# Patient Record
Sex: MALE | Race: WHITE | NOT HISPANIC OR LATINO | Employment: OTHER | URBAN - METROPOLITAN AREA
[De-identification: names, ages, dates, MRNs, and addresses within clinical notes are randomized per-mention and may not be internally consistent; named-entity substitution may affect disease eponyms.]

---

## 2017-04-19 ENCOUNTER — OFFICE VISIT (OUTPATIENT)
Dept: OPHTHALMOLOGY | Facility: CLINIC | Age: 77
End: 2017-04-19
Payer: MEDICARE

## 2017-04-19 DIAGNOSIS — H00.13 CHALAZION, RIGHT: Primary | ICD-10-CM

## 2017-04-19 PROCEDURE — 99999 PR PBB SHADOW E&M-EST. PATIENT-LVL II: CPT | Mod: PBBFAC,,, | Performed by: OPHTHALMOLOGY

## 2017-04-19 PROCEDURE — 92012 INTRM OPH EXAM EST PATIENT: CPT | Mod: S$GLB,,, | Performed by: OPHTHALMOLOGY

## 2017-04-19 RX ORDER — SIMVASTATIN 40 MG/1
40 TABLET, FILM COATED ORAL NIGHTLY
COMMUNITY

## 2017-04-19 RX ORDER — ASPIRIN 81 MG/1
81 TABLET ORAL DAILY
COMMUNITY

## 2017-04-19 RX ORDER — NEOMYCIN SULFATE, POLYMYXIN B SULFATE, AND DEXAMETHASONE 3.5; 10000; 1 MG/G; [USP'U]/G; MG/G
OINTMENT OPHTHALMIC 4 TIMES DAILY
Qty: 1 TUBE | Refills: 1 | Status: SHIPPED | OUTPATIENT
Start: 2017-04-19 | End: 2017-04-29

## 2017-04-19 RX ORDER — IRBESARTAN 75 MG/1
75 TABLET ORAL NIGHTLY
COMMUNITY

## 2017-04-19 RX ORDER — PRASUGREL HYDROCHLORIDE 10 MG/1
10 TABLET, COATED ORAL DAILY
COMMUNITY
End: 2019-03-11

## 2017-04-19 NOTE — PROGRESS NOTES
HPI     Patient c/o redness with  soreness od for one week and yesterday the bump   appeared on the RLL.        LAST SAW [facility] 04/15/15  YOU DID PCIOL OU X YEARS AGO(PATIENT HAS MONOVISION  IMPLANTS)  OD FOR D  OS FOR N       Last edited by MARILU Tolentino on 4/19/2017  4:07 PM.         Assessment /Plan     For exam results, see Encounter Report.      ICD-10-CM ICD-9-CM    1. Nayana, right H00.13 373.2 Will begin Maxitrol LENY QID OD for 10 days then stop, also recommend WC at least twice a day        Return to clinic PRN

## 2017-04-26 ENCOUNTER — TELEPHONE (OUTPATIENT)
Dept: OPHTHALMOLOGY | Facility: CLINIC | Age: 77
End: 2017-04-26

## 2017-04-26 NOTE — TELEPHONE ENCOUNTER
----- Message from Alba Sanches sent at 4/26/2017 11:27 AM CDT -----  Contact: pt   States he is calling rg having some questions about his medicine that he was given on his last visit and can be reached at [phone]//thanks/law    Wants to know if he can use a salve also

## 2019-02-08 ENCOUNTER — OFFICE VISIT (OUTPATIENT)
Dept: OPHTHALMOLOGY | Facility: CLINIC | Age: 79
End: 2019-02-08
Payer: MEDICARE

## 2019-02-08 DIAGNOSIS — H04.123 DRY EYE SYNDROME OF BOTH EYES: ICD-10-CM

## 2019-02-08 DIAGNOSIS — Z96.1 PSEUDOPHAKIA OF BOTH EYES: Primary | ICD-10-CM

## 2019-02-08 DIAGNOSIS — H26.491 PCO (POSTERIOR CAPSULE OPACIFICATION), RIGHT: ICD-10-CM

## 2019-02-08 PROCEDURE — 99999 PR PBB SHADOW E&M-EST. PATIENT-LVL I: ICD-10-PCS | Mod: PBBFAC,,, | Performed by: OPHTHALMOLOGY

## 2019-02-08 PROCEDURE — 99999 PR PBB SHADOW E&M-EST. PATIENT-LVL I: CPT | Mod: PBBFAC,,, | Performed by: OPHTHALMOLOGY

## 2019-02-08 PROCEDURE — 92014 PR EYE EXAM, EST PATIENT,COMPREHESV: ICD-10-PCS | Mod: S$GLB,,, | Performed by: OPHTHALMOLOGY

## 2019-02-08 PROCEDURE — 92014 COMPRE OPH EXAM EST PT 1/>: CPT | Mod: S$GLB,,, | Performed by: OPHTHALMOLOGY

## 2019-02-08 NOTE — PROGRESS NOTES
HPI     The patient states he is here having a yearly checkup. The patient states   his eyes have been pretty dry and he has started taking theratears.    LAST SAW [facility] 4/19/17    1. PCIOL OU X YEARS AGO( PATIENT HAS MONOVISION IMPLANTS)  OD FOR D  OS FOR N  2. HX Chalazion OD    Thera Tears     Last edited by Ema Lozano on 2/8/2019  1:29 PM. (History)            Assessment /Plan     For exam results, see Encounter Report.      ICD-10-CM ICD-9-CM    1. Pseudophakia of both eyes Z96.1 V43.1 Stable    2. 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 Yag laser treatment as needed if visual loss occurs.            3. Dry eye syndrome of both eyes H04.123 375.15 Well. Use thera tears fish oil and systane.        Return to clinic 1 year or PRN

## 2019-03-11 ENCOUNTER — OFFICE VISIT (OUTPATIENT)
Dept: OPHTHALMOLOGY | Facility: CLINIC | Age: 79
End: 2019-03-11
Payer: MEDICARE

## 2019-03-11 DIAGNOSIS — H43.811 POSTERIOR VITREOUS DEGENERATION, RIGHT: Primary | ICD-10-CM

## 2019-03-11 DIAGNOSIS — H43.11 VITREOUS HEMORRHAGE OF RIGHT EYE: ICD-10-CM

## 2019-03-11 PROCEDURE — 92014 PR EYE EXAM, EST PATIENT,COMPREHESV: ICD-10-PCS | Mod: S$GLB,,, | Performed by: OPHTHALMOLOGY

## 2019-03-11 PROCEDURE — 99999 PR PBB SHADOW E&M-EST. PATIENT-LVL II: ICD-10-PCS | Mod: PBBFAC,,, | Performed by: OPHTHALMOLOGY

## 2019-03-11 PROCEDURE — 99999 PR PBB SHADOW E&M-EST. PATIENT-LVL II: CPT | Mod: PBBFAC,,, | Performed by: OPHTHALMOLOGY

## 2019-03-11 PROCEDURE — 92014 COMPRE OPH EXAM EST PT 1/>: CPT | Mod: S$GLB,,, | Performed by: OPHTHALMOLOGY

## 2019-03-11 NOTE — PATIENT INSTRUCTIONS
Office Number [phone] or [phone]  For after hours call [phone]   In emergency only after hours call or text Dr. Clinton cell phone [phone]  For an appointment change or non-emergent issues, please call the office number during office hours.    Keep in mind, Dr. De Leon clinic hours are usually 7:00 - 3:30.  We are not here until 5:00 on most days.

## 2019-03-19 ENCOUNTER — OFFICE VISIT (OUTPATIENT)
Dept: OPHTHALMOLOGY | Facility: CLINIC | Age: 79
End: 2019-03-19
Payer: MEDICARE

## 2019-03-19 DIAGNOSIS — H43.811 POSTERIOR VITREOUS DEGENERATION, RIGHT: ICD-10-CM

## 2019-03-19 DIAGNOSIS — H43.11 VITREOUS HEMORRHAGE OF RIGHT EYE: Primary | ICD-10-CM

## 2019-03-19 PROCEDURE — 92012 INTRM OPH EXAM EST PATIENT: CPT | Mod: S$GLB,,, | Performed by: OPHTHALMOLOGY

## 2019-03-19 PROCEDURE — 99999 PR PBB SHADOW E&M-EST. PATIENT-LVL II: CPT | Mod: PBBFAC,,, | Performed by: OPHTHALMOLOGY

## 2019-03-19 PROCEDURE — 92012 PR EYE EXAM, EST PATIENT,INTERMED: ICD-10-PCS | Mod: S$GLB,,, | Performed by: OPHTHALMOLOGY

## 2019-03-19 PROCEDURE — 99999 PR PBB SHADOW E&M-EST. PATIENT-LVL II: ICD-10-PCS | Mod: PBBFAC,,, | Performed by: OPHTHALMOLOGY

## 2019-03-19 NOTE — PROGRESS NOTES
===============================  03/19/2019   Georgi Salguero,   78 y.o. male   Last visit [facility]: :3/11/2019   Last visit [facility] dept[facility] 3/11/2019  [facility]:  Uncorrected distance visual acuity was 20/30 -1 in the right eye and J1 in the left eye.  Tonometry     Tonometry (Applanation, 1:10 PM)       Right Left    Pressure 14 14               Not recorded         Not recorded        Chief Complaint   Patient presents with    Vitreous Heme w/Smith Ring     1 week recheck PVD/V-heme OD. pt states no changes since his visit last week. no ocular pain or irritation. vision is the same.         HPI     Vitreous Heme w/Smith Ring      Additional comments: 1 week recheck PVD/V-heme OD. pt states no changes   since his visit last week. no ocular pain or irritation. vision is the   same.               Comments     LAST SAW [facility] 4/19/17    1. PCIOL OU X YEARS AGO( PATIENT HAS MONOVISION IMPLANTS)  OD FOR D  OS FOR N  2. HX Chalazion OD  3. Vitreous Heme/PVD w/ Smith ring OD 3/11/19    Thera Tears          Last edited by Elder Byrne on 3/19/2019  1:13 PM. (History)          ________________  3/19/2019  Problem List Items Addressed This Visit        Eye/Vision problems    Vitreous hemorrhage of right eye - Primary    Posterior vitreous degeneration, right          .resolved v heme  Reviewed s/s RT, RD  Instructed to call with any worsening  Otherwise rtc with Dr. Walters as scheduled       ============================

## 2019-07-29 ENCOUNTER — TELEPHONE (OUTPATIENT)
Dept: OPHTHALMOLOGY | Facility: CLINIC | Age: 79
End: 2019-07-29

## 2019-07-29 NOTE — TELEPHONE ENCOUNTER
----- Message from Avis Sanders sent at 7/29/2019 11:03 AM CDT -----  Contact: Patient  Patient states he has a bump on lower part of eye and needs a refill on the suave that was prescribed before for him, he didn't know the name- [facility]. Please call patient back if needed at [phone]. Thank you

## 2019-07-29 NOTE — TELEPHONE ENCOUNTER
Spoke with pt.     Pt states bump is very small.   Advised to try warm compresses.  If doesn't resolve after 2 weeks or so we can arrange appt for removal.

## 2019-08-01 ENCOUNTER — OFFICE VISIT (OUTPATIENT)
Dept: OPHTHALMOLOGY | Facility: CLINIC | Age: 79
End: 2019-08-01
Payer: COMMERCIAL

## 2019-08-01 DIAGNOSIS — H02.9 EYELID LESION: Primary | ICD-10-CM

## 2019-08-01 PROCEDURE — 99999 PR PBB SHADOW E&M-EST. PATIENT-LVL II: CPT | Mod: PBBFAC,,, | Performed by: OPTOMETRIST

## 2019-08-01 PROCEDURE — 99999 PR PBB SHADOW E&M-EST. PATIENT-LVL II: ICD-10-PCS | Mod: PBBFAC,,, | Performed by: OPTOMETRIST

## 2019-08-01 PROCEDURE — 92012 PR EYE EXAM, EST PATIENT,INTERMED: ICD-10-PCS | Mod: S$GLB,,, | Performed by: OPTOMETRIST

## 2019-08-01 PROCEDURE — 92012 INTRM OPH EXAM EST PATIENT: CPT | Mod: S$GLB,,, | Performed by: OPTOMETRIST

## 2019-08-01 NOTE — PROGRESS NOTES
HPI     Eye Problem      Additional comments: OD irritated OD              Comments     Last seen by [facility] on 3/19/19 for Vitreous Hemorrhage OD  Patient states he has noticed a small bump in the right eye  Pain Scale:  3  Onset:   4-6 months  OD, OS, OU:   OD  Discharge:   Yes  A.M. Matting:  Yes  Itch:   No  Redness:   Yes  Photophobia:   No  Foreign body sensation:   No  Deep pain:   Yes  Previous occurrence:   Yes  Drops:   No drops warm compress          Last edited by Arpita Carr, PCT on 8/1/2019  1:11 PM. (History)            Assessment /Plan     For exam results, see Encounter Report.    Eyelid lesion    umbilicated lesion along RL lid margin  Refer to Dr Walters for consult

## 2019-08-09 ENCOUNTER — TELEPHONE (OUTPATIENT)
Dept: OPHTHALMOLOGY | Facility: CLINIC | Age: 79
End: 2019-08-09

## 2019-08-09 ENCOUNTER — OFFICE VISIT (OUTPATIENT)
Dept: OPHTHALMOLOGY | Facility: CLINIC | Age: 79
End: 2019-08-09
Payer: MEDICARE

## 2019-08-09 DIAGNOSIS — H02.9 EYELID LESION: Primary | ICD-10-CM

## 2019-08-09 PROCEDURE — 92012 PR EYE EXAM, EST PATIENT,INTERMED: ICD-10-PCS | Mod: S$GLB,,, | Performed by: OPHTHALMOLOGY

## 2019-08-09 PROCEDURE — 99999 PR PBB SHADOW E&M-EST. PATIENT-LVL II: ICD-10-PCS | Mod: PBBFAC,,, | Performed by: OPHTHALMOLOGY

## 2019-08-09 PROCEDURE — 99999 PR PBB SHADOW E&M-EST. PATIENT-LVL II: CPT | Mod: PBBFAC,,, | Performed by: OPHTHALMOLOGY

## 2019-08-09 PROCEDURE — 92012 INTRM OPH EXAM EST PATIENT: CPT | Mod: S$GLB,,, | Performed by: OPHTHALMOLOGY

## 2019-08-09 RX ORDER — CETIRIZINE HYDROCHLORIDE 10 MG/1
10 TABLET ORAL DAILY
COMMUNITY

## 2019-08-09 NOTE — Clinical Note
Zonia, Please eval for BCC of RLL. Can you please have your staff contact the patient ASAPPt is also taking ASA

## 2019-08-09 NOTE — PROGRESS NOTES
HPI     Eye Problem      Additional comments: eyelid lesion right lower lid evaluation               Comments     Pt states in the mornings his right eye is bothering him and is closed   due to the growth on his eyelid. States the growth has been there for   years and he has tried warm compresses and ointments with no improvement.     1. PCIOL OU X YEARS AGO( PATIENT HAS MONOVISION IMPLANTS)  OD FOR D  OS FOR N  2. HX Chalazion OD  3. Vitreous Heme/PVD w/ Smith ring OD 3/11/19    Thera Tears          Last edited by Supriya Kerns MA on 8/9/2019  1:14 PM. (History)            Assessment /Plan     For exam results, see Encounter Report.      ICD-10-CM ICD-9-CM    1. Eyelid lesion H02.9 374.9      Exam consistent with basal cell of RLL   Refer to [facility] for Eval

## 2019-08-09 NOTE — TELEPHONE ENCOUNTER
----- Message from Jose Guadalupe Walters MD sent at 8/9/2019  1:29 PM CDT -----  Zonia,   Please eval for BCC of RLL. Can you please have your staff contact the patient ASAP  Pt is also taking ASA

## 2019-08-13 ENCOUNTER — TELEPHONE (OUTPATIENT)
Dept: OPHTHALMOLOGY | Facility: CLINIC | Age: 79
End: 2019-08-13

## 2019-08-13 NOTE — TELEPHONE ENCOUNTER
----- Message from Halima Orr sent at 8/13/2019 10:09 AM CDT -----  Contact: pt  [phone]  Calling states he need to speak with staff Yasmeen harrell pt states she was suppose to contact him on last week with name and information of a surgery  pt states this is very important

## 2019-08-23 ENCOUNTER — TELEPHONE (OUTPATIENT)
Dept: OPHTHALMOLOGY | Facility: CLINIC | Age: 79
End: 2019-08-23

## 2019-08-23 NOTE — TELEPHONE ENCOUNTER
----- Message from Gabrielle Lees sent at 8/23/2019 10:17 AM CDT -----  Contact: Pt   Pt called in regards to cancelling the appointment for Dr. Monge. Pt stated that he found the number and cancelled the appointment. Pt can be reached at [phone] (nssb).

## 2020-03-04 ENCOUNTER — OFFICE VISIT (OUTPATIENT)
Dept: OPHTHALMOLOGY | Facility: CLINIC | Age: 80
End: 2020-03-04
Payer: MEDICARE

## 2020-03-04 DIAGNOSIS — H02.055 TRICHIASIS OF LEFT LOWER EYELID: ICD-10-CM

## 2020-03-04 DIAGNOSIS — Z96.1 PSEUDOPHAKIA OF BOTH EYES: Primary | ICD-10-CM

## 2020-03-04 DIAGNOSIS — H02.9 EYELID LESION: ICD-10-CM

## 2020-03-04 DIAGNOSIS — H26.491 PCO (POSTERIOR CAPSULE OPACIFICATION), RIGHT: ICD-10-CM

## 2020-03-04 PROCEDURE — 67820 PR REVISE EYELASHES,FORCEPS: ICD-10-PCS | Mod: LT,S$GLB,, | Performed by: OPHTHALMOLOGY

## 2020-03-04 PROCEDURE — 67820 REVISE EYELASHES: CPT | Mod: LT,S$GLB,, | Performed by: OPHTHALMOLOGY

## 2020-03-04 PROCEDURE — 92014 COMPRE OPH EXAM EST PT 1/>: CPT | Mod: 25,S$GLB,, | Performed by: OPHTHALMOLOGY

## 2020-03-04 PROCEDURE — 92014 PR EYE EXAM, EST PATIENT,COMPREHESV: ICD-10-PCS | Mod: 25,S$GLB,, | Performed by: OPHTHALMOLOGY

## 2020-03-04 PROCEDURE — 99999 PR PBB SHADOW E&M-EST. PATIENT-LVL II: ICD-10-PCS | Mod: PBBFAC,,, | Performed by: OPHTHALMOLOGY

## 2020-03-04 PROCEDURE — 99999 PR PBB SHADOW E&M-EST. PATIENT-LVL II: CPT | Mod: PBBFAC,,, | Performed by: OPHTHALMOLOGY

## 2020-03-04 NOTE — PROGRESS NOTES
HPI     The patient states his eyes are doing well and he denies any ocular pain   at this time. The patient states he is being followed by  and he   is watching his RLL.    LAST SAW [facility] 4/19/17    1. PCIOL OU X YEARS AGO( PATIENT HAS MONOVISION IMPLANTS)  OD FOR D  OS FOR N  2. HX Chalazion OD  3. Vitreous Heme/PVD w/ Smith ring OD 3/11/19    Thera Tears    Last edited by Ema Lozano on 3/4/2020  1:24 PM. (History)            Assessment /Plan     For exam results, see Encounter Report.      ICD-10-CM ICD-9-CM    1. Pseudophakia of both eyes Z96.1 V43.1 Stable    2. 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 Yag laser treatment as needed if visual loss occurs.            3. Eyelid lesion H02.9 374.9 RLL. Followed by Dr. Parker    4. 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  Risk, benefits and alternatives to cilia removal by forceps was reviewed and pt requested that lashes be manually removed.  AkTaine gtts introduced into both eyes and cilia forceps, under slit lamp imagnification,  were used for epilation, removing the following number of cilia:  RUL: 0  SHONDA: 0  RLL: 0   LLL: 2            Return to clinic 1 year or PRN

## 2021-02-05 ENCOUNTER — OFFICE VISIT (OUTPATIENT)
Dept: OPHTHALMOLOGY | Facility: CLINIC | Age: 81
End: 2021-02-05
Payer: COMMERCIAL

## 2021-02-05 DIAGNOSIS — H10.501 BLEPHAROCONJUNCTIVITIS OF RIGHT EYE, UNSPECIFIED BLEPHAROCONJUNCTIVITIS TYPE: Primary | ICD-10-CM

## 2021-02-05 DIAGNOSIS — H02.051 TRICHIASIS OF RIGHT UPPER EYELID: ICD-10-CM

## 2021-02-05 PROCEDURE — 67820 REVISE EYELASHES: CPT | Mod: RT,S$GLB,, | Performed by: OPTOMETRIST

## 2021-02-05 PROCEDURE — 92012 PR EYE EXAM, EST PATIENT,INTERMED: ICD-10-PCS | Mod: 25,S$GLB,, | Performed by: OPTOMETRIST

## 2021-02-05 PROCEDURE — 67820 PR REVISE EYELASHES,FORCEPS: ICD-10-PCS | Mod: RT,S$GLB,, | Performed by: OPTOMETRIST

## 2021-02-05 PROCEDURE — 92012 INTRM OPH EXAM EST PATIENT: CPT | Mod: 25,S$GLB,, | Performed by: OPTOMETRIST

## 2021-02-05 PROCEDURE — 99999 PR PBB SHADOW E&M-EST. PATIENT-LVL II: ICD-10-PCS | Mod: PBBFAC,,, | Performed by: OPTOMETRIST

## 2021-02-05 PROCEDURE — 99999 PR PBB SHADOW E&M-EST. PATIENT-LVL II: CPT | Mod: PBBFAC,,, | Performed by: OPTOMETRIST

## 2021-02-05 RX ORDER — TOBRAMYCIN AND DEXAMETHASONE 3; 1 MG/ML; MG/ML
1 SUSPENSION/ DROPS OPHTHALMIC 4 TIMES DAILY
Qty: 1 BOTTLE | Refills: 0 | Status: SHIPPED | OUTPATIENT
Start: 2021-02-05 | End: 2021-02-12

## 2021-07-06 ENCOUNTER — OFFICE VISIT (OUTPATIENT)
Dept: OPHTHALMOLOGY | Facility: CLINIC | Age: 81
End: 2021-07-06
Payer: MEDICARE

## 2021-07-06 DIAGNOSIS — H26.491 PCO (POSTERIOR CAPSULAR OPACIFICATION), RIGHT: ICD-10-CM

## 2021-07-06 DIAGNOSIS — H52.7 REFRACTION DISORDER: ICD-10-CM

## 2021-07-06 DIAGNOSIS — H01.014 ULCERATIVE BLEPHARITIS OF UPPER EYELIDS OF BOTH EYES: Primary | ICD-10-CM

## 2021-07-06 DIAGNOSIS — Z96.1 PSEUDOPHAKIA OF BOTH EYES: ICD-10-CM

## 2021-07-06 DIAGNOSIS — H01.011 ULCERATIVE BLEPHARITIS OF UPPER EYELIDS OF BOTH EYES: Primary | ICD-10-CM

## 2021-07-06 PROCEDURE — 99999 PR PBB SHADOW E&M-EST. PATIENT-LVL II: ICD-10-PCS | Mod: PBBFAC,,, | Performed by: OPHTHALMOLOGY

## 2021-07-06 PROCEDURE — 99999 PR PBB SHADOW E&M-EST. PATIENT-LVL II: CPT | Mod: PBBFAC,,, | Performed by: OPHTHALMOLOGY

## 2021-07-06 PROCEDURE — 92014 PR EYE EXAM, EST PATIENT,COMPREHESV: ICD-10-PCS | Mod: S$GLB,,, | Performed by: OPHTHALMOLOGY

## 2021-07-06 PROCEDURE — 92014 COMPRE OPH EXAM EST PT 1/>: CPT | Mod: S$GLB,,, | Performed by: OPHTHALMOLOGY

## 2021-07-06 RX ORDER — ROSUVASTATIN CALCIUM 40 MG/1
1 TABLET, COATED ORAL NIGHTLY
COMMUNITY
Start: 2021-05-12

## 2021-11-08 ENCOUNTER — CLINICAL SUPPORT (OUTPATIENT)
Dept: AUDIOLOGY | Facility: CLINIC | Age: 81
End: 2021-11-08
Payer: MEDICARE

## 2021-11-08 ENCOUNTER — OFFICE VISIT (OUTPATIENT)
Dept: OTOLARYNGOLOGY | Facility: CLINIC | Age: 81
End: 2021-11-08
Payer: MEDICARE

## 2021-11-08 VITALS — TEMPERATURE: 98 F

## 2021-11-08 DIAGNOSIS — R42 DIZZINESS: Primary | ICD-10-CM

## 2021-11-08 DIAGNOSIS — H61.21 RIGHT EAR IMPACTED CERUMEN: Primary | ICD-10-CM

## 2021-11-08 DIAGNOSIS — R42 DIZZINESS: ICD-10-CM

## 2021-11-08 DIAGNOSIS — H90.3 SENSORY HEARING LOSS, BILATERAL: Primary | ICD-10-CM

## 2021-11-08 PROCEDURE — 1160F RVW MEDS BY RX/DR IN RCRD: CPT | Mod: CPTII,S$GLB,, | Performed by: OTOLARYNGOLOGY

## 2021-11-08 PROCEDURE — 3288F FALL RISK ASSESSMENT DOCD: CPT | Mod: CPTII,S$GLB,, | Performed by: OTOLARYNGOLOGY

## 2021-11-08 PROCEDURE — 99999 PR PBB SHADOW E&M-EST. PATIENT-LVL II: CPT | Mod: PBBFAC,,, | Performed by: OTOLARYNGOLOGY

## 2021-11-08 PROCEDURE — 1160F PR REVIEW ALL MEDS BY PRESCRIBER/CLIN PHARMACIST DOCUMENTED: ICD-10-PCS | Mod: CPTII,S$GLB,, | Performed by: OTOLARYNGOLOGY

## 2021-11-08 PROCEDURE — 1126F PR PAIN SEVERITY QUANTIFIED, NO PAIN PRESENT: ICD-10-PCS | Mod: CPTII,S$GLB,, | Performed by: OTOLARYNGOLOGY

## 2021-11-08 PROCEDURE — 3288F PR FALLS RISK ASSESSMENT DOCUMENTED: ICD-10-PCS | Mod: CPTII,S$GLB,, | Performed by: OTOLARYNGOLOGY

## 2021-11-08 PROCEDURE — 69210 PR REMOVAL IMPACTED CERUMEN REQUIRING INSTRUMENTATION, UNILATERAL: ICD-10-PCS | Mod: S$GLB,,, | Performed by: OTOLARYNGOLOGY

## 2021-11-08 PROCEDURE — 1126F AMNT PAIN NOTED NONE PRSNT: CPT | Mod: CPTII,S$GLB,, | Performed by: OTOLARYNGOLOGY

## 2021-11-08 PROCEDURE — 99203 PR OFFICE/OUTPT VISIT, NEW, LEVL III, 30-44 MIN: ICD-10-PCS | Mod: 25,S$GLB,, | Performed by: OTOLARYNGOLOGY

## 2021-11-08 PROCEDURE — 99203 OFFICE O/P NEW LOW 30 MIN: CPT | Mod: 25,S$GLB,, | Performed by: OTOLARYNGOLOGY

## 2021-11-08 PROCEDURE — 99999 PR PBB SHADOW E&M-EST. PATIENT-LVL II: ICD-10-PCS | Mod: PBBFAC,,, | Performed by: OTOLARYNGOLOGY

## 2021-11-08 PROCEDURE — 1101F PT FALLS ASSESS-DOCD LE1/YR: CPT | Mod: CPTII,S$GLB,, | Performed by: OTOLARYNGOLOGY

## 2021-11-08 PROCEDURE — 69210 REMOVE IMPACTED EAR WAX UNI: CPT | Mod: S$GLB,,, | Performed by: OTOLARYNGOLOGY

## 2021-11-08 PROCEDURE — 1159F PR MEDICATION LIST DOCUMENTED IN MEDICAL RECORD: ICD-10-PCS | Mod: CPTII,S$GLB,, | Performed by: OTOLARYNGOLOGY

## 2021-11-08 PROCEDURE — 1101F PR PT FALLS ASSESS DOC 0-1 FALLS W/OUT INJ PAST YR: ICD-10-PCS | Mod: CPTII,S$GLB,, | Performed by: OTOLARYNGOLOGY

## 2021-11-08 PROCEDURE — 1159F MED LIST DOCD IN RCRD: CPT | Mod: CPTII,S$GLB,, | Performed by: OTOLARYNGOLOGY

## 2021-11-08 RX ORDER — LORATADINE 10 MG/1
10 TABLET ORAL DAILY
COMMUNITY
End: 2022-08-03

## 2021-11-12 ENCOUNTER — CLINICAL SUPPORT (OUTPATIENT)
Dept: AUDIOLOGY | Facility: CLINIC | Age: 81
End: 2021-11-12
Payer: MEDICARE

## 2021-11-12 DIAGNOSIS — H90.3 SENSORY HEARING LOSS, BILATERAL: Primary | ICD-10-CM

## 2021-11-12 DIAGNOSIS — R42 DIZZINESS: ICD-10-CM

## 2022-08-03 ENCOUNTER — OFFICE VISIT (OUTPATIENT)
Dept: OPHTHALMOLOGY | Facility: CLINIC | Age: 82
End: 2022-08-03
Payer: MEDICARE

## 2022-08-03 DIAGNOSIS — H26.491 PCO (POSTERIOR CAPSULAR OPACIFICATION), RIGHT: ICD-10-CM

## 2022-08-03 DIAGNOSIS — Z96.1 PSEUDOPHAKIA OF BOTH EYES: Primary | ICD-10-CM

## 2022-08-03 DIAGNOSIS — H01.011 ULCERATIVE BLEPHARITIS OF UPPER EYELIDS OF BOTH EYES: ICD-10-CM

## 2022-08-03 DIAGNOSIS — H01.014 ULCERATIVE BLEPHARITIS OF UPPER EYELIDS OF BOTH EYES: ICD-10-CM

## 2022-08-03 PROCEDURE — 99999 PR PBB SHADOW E&M-EST. PATIENT-LVL II: CPT | Mod: PBBFAC,,, | Performed by: OPHTHALMOLOGY

## 2022-08-03 PROCEDURE — 1159F PR MEDICATION LIST DOCUMENTED IN MEDICAL RECORD: ICD-10-PCS | Mod: CPTII,S$GLB,, | Performed by: OPHTHALMOLOGY

## 2022-08-03 PROCEDURE — 99214 PR OFFICE/OUTPT VISIT, EST, LEVL IV, 30-39 MIN: ICD-10-PCS | Mod: S$GLB,,, | Performed by: OPHTHALMOLOGY

## 2022-08-03 PROCEDURE — 1159F MED LIST DOCD IN RCRD: CPT | Mod: CPTII,S$GLB,, | Performed by: OPHTHALMOLOGY

## 2022-08-03 PROCEDURE — 1160F PR REVIEW ALL MEDS BY PRESCRIBER/CLIN PHARMACIST DOCUMENTED: ICD-10-PCS | Mod: CPTII,S$GLB,, | Performed by: OPHTHALMOLOGY

## 2022-08-03 PROCEDURE — 99999 PR PBB SHADOW E&M-EST. PATIENT-LVL II: ICD-10-PCS | Mod: PBBFAC,,, | Performed by: OPHTHALMOLOGY

## 2022-08-03 PROCEDURE — 1160F RVW MEDS BY RX/DR IN RCRD: CPT | Mod: CPTII,S$GLB,, | Performed by: OPHTHALMOLOGY

## 2022-08-03 PROCEDURE — 99214 OFFICE O/P EST MOD 30 MIN: CPT | Mod: S$GLB,,, | Performed by: OPHTHALMOLOGY

## 2022-08-03 RX ORDER — MONTELUKAST SODIUM 10 MG/1
1 TABLET ORAL NIGHTLY
COMMUNITY
Start: 2022-05-02 | End: 2023-05-02

## 2022-08-03 NOTE — PROGRESS NOTES
HPI     Belepharitis     Comments: 1 year              Comments     Patient states that he has a bit more difficulty when looking from near   to distance - using thera tears PO 3 capsules every morning /  refresh PM   - no pain/ discomfort OU         1. PCIOL OU ( PATIENT HAS MONOVISION IMPLANTS)   OD FOR D 2011  OS FOR N 2011  2. HX Chalazion OD   3. Vitreous Heme/PVD w/ Smith ring OD 3/11/19   4. RLL Lesion followed by Dr.Saloom Candice Sheppard            Last edited by Griselda Orellana MA on 8/3/2022  1:21 PM. (History)            Assessment /Plan     For exam results, see Encounter Report.      ICD-10-CM ICD-9-CM    1. Pseudophakia of both eyes  Z96.1 V43.1 Stable, monitor   2. 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 Yag laser treatment as needed if visual loss occurs.        Return to clinic 1 year or PRN

## 2024-05-08 NOTE — PROGRESS NOTES
===============================  03/11/2019   Georgi Salguero,   78 y.o. male   Last visit [facility]: :Visit date not found   Last visit eye dept. 2/8/2019  [facility]:  Uncorrected distance visual acuity was 20/30 in the right eye and not recorded in the left eye. Uncorrected near visual acuity was not recorded in the right eye and J1 in the left eye.  Tonometry     Tonometry (Applanation, 2:36 PM)       Right Left    Pressure 13 14               Not recorded         Not recorded        Chief Complaint   Patient presents with    Spots and/or Floaters     pt states that he sees a spider web in his right eye/  started up on thursday moring        HPI     Spots and/or Floaters      Additional comments: pt states that he sees a spider web in his right   eye/  started up on thursday moring              Comments     LAST SAW [facility] 4/19/17    1. PCIOL OU X YEARS AGO( PATIENT HAS MONOVISION IMPLANTS)  OD FOR D  OS FOR N  2. HX Chalazion OD    Thera Tears          Last edited by Florecita Richey on 3/11/2019  2:29 PM. (History)          ________________  3/11/2019  Problem List Items Addressed This Visit        Eye/Vision problems    Vitreous hemorrhage of right eye    Posterior vitreous degeneration, right - Primary        Hem hoover ring    rtc 1  Week call for worse  oct  .       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  I would recommend topical Voltaren gel up to 4 times a day as needed as well as an elbow compression sleeve.  Please reach out with any questions or concerns.  We will see you back in the office on an as-needed basis.